# Patient Record
Sex: FEMALE | Race: WHITE
[De-identification: names, ages, dates, MRNs, and addresses within clinical notes are randomized per-mention and may not be internally consistent; named-entity substitution may affect disease eponyms.]

---

## 2017-04-05 NOTE — NUR
PT BIB [facility] C/O AMS SINCE YESTERDAY. A/OX1 ONLY, NORMALLY X4. RESP EVEN 
UNLABORED. DENIES PAIN. SKIN WARM NONDIAPHORETIC. PT'S DAUGHTER STATES "HER 
SPEECH WAS SLURRED YESTERDAY"; NO SLURRED SPEECH OR OTHER FOCAL DEFICITS NOTED. 
IN ER BED 09 ON MONITOR.

## 2017-04-05 NOTE — NUR
TELE/RN

PATIENT IS SLEEPING AT THIS TIME, AROUSABLE, APPEAR COMFORTABLE, NO SIGNS OF DISTRESS NOTED, 
CALL LIGHT IN REACH. WILL CONTINUE TO MONITOR.

## 2017-04-05 NOTE — NUR
TELE/RN

RECEIVE PATIENT AWAKE, ALERT, BUT NOT SO VERBALLY RESPONSIVE, COMFORTABLE, NO SIGNS OF 
DISTRESS NOTED. WILL DO ADMISSION LATER.

## 2017-04-05 NOTE — NUR
TELE/RN

ADMISSION DONE PER PROTOCOL, PATIENT IS NOT A GOOD HISTORIAN. DAUGHTER, ARNOL, AT BEDSIDE 
PROVIDED THE INFORMATION FOR THE PATIENT. FALL PRECAUTION PER PROTOCOL INSTITUTE. WILL 
CONTINUE TO MONITOR.

## 2017-04-06 NOTE — NUR
TELE RN OPENING NOTES 



RECEIVED PATIENT AWAKE AND IN STABLE CONDITION FROM NIGHT SHIFT NURSE. TELE MONITOR ON 
READING SINUS RHYTHM . ALERT X1.  NO SIGNS OF DISTRESS NOTED. WILL CONTINUE TO MONITOR.

## 2017-04-06 NOTE — NUR
TELE/RN

PATIENT IS SLEEPING, AROUSABLE, NO CHANGE IN CONDITION. ALL NEEDS ATTENDED AT THIS TIME. 
WILL CONTINUE TO MONITOR.

## 2017-04-07 NOTE — NUR
PT IMPROVED MENTALLY, A/O X3 FOR NOW, NO SOB OR DISTRESS NOTED, NO BEHAVIOR PROBLEMS NOTED, 
COMPLIANT WITH HER IV MEDICATIONS, WILL INDORSE TO NEXT SHIFT FOR ANUJA.

## 2017-04-07 NOTE — NUR
pt refused Kayexalate, pt educated but she stated: " I do not have any problems, my 
potassium level is perfect, I will not drink that".

## 2017-04-07 NOTE — NUR
TELE RN NOTES



AWAKE & RESPONSIVE. NOT IN ANY DISTRESS. NO SOB NOTED. DENIES ANY PAIN OR DISCOMFORT AT THIS 
TIME. ON TELE SR @ 60 WITH IV-HL PATENT & INTACT. MONITORED ACCORDINGLY. CALL LIGHT WITHIN 
REACH. BED IN LOWEST POSITION. SR UP X 3 WITH BED ALARM ON FOR SAFETY. WILL ENDORSE TO NEXT 
SHIFT.

## 2017-04-07 NOTE — NUR
MS RN OPENING NOTES



RECEIVED PATIENT IN BED IN STABLE CONDITION, IV SITE INTACT WITH S/S OF INFILTRATION. NO S/S 
OF DISTRESS NO SOB, NO CHEST PAIN. NO S/S PAIN, SAFE FREE ENVIRONMENT PROVIDED FREE OF 
CLUTTERS, WILL CONTINUE TO MONITOR, ON LOW BED TO ENSURE SAFETY, CALL LIGHT WITHIN REACH.

## 2017-04-08 NOTE — NUR
RN DISCHARGED NOTES



PATIENT DISCHARGE HOME AND LEFT UNIT AT 1630 IN STABLE CONDITION VIA WHEELCHAIR ACCOMPANIED 
BY  OF MEDICAL TRANSPORTATION SERVICE AND CAREGIVER. ALERT AND ORIENTED X 3, NO 
COMPLAINTS OF PAIN OR DISCOMFORTS DURING DISCHARGE. V/S CHECKED AND RECORDED. SKIN IS 
INTACT. BELONGINGS CHECKED, COUNTED SIGNED AND FORM. PNEUMO VACCINE REFUSED BY PATIENT. 
HEALTH TEACHINGS GIVEN TO PATIENT AND CAREGIVER AND BOTH VERBALIZED UNDERSTANDING. MD AND 
CHARGE NURSE AWARE OF DISCHARGE.

## 2017-04-08 NOTE — NUR
MS RN CLOSING NOTES



PATIENT COMFORTABLY ASLEEP AND EASILY AWAKEN,  IV SITE NO S/S OF INFILTRATED, IV ONGOING 
75CC/HR TOLERATING WELL. ALL DUE MEDS WAS GIVEN.  PATIENT DENIES PAIN AT THIS TIME. 0/10 
RESPIRATIONS EVEN AND UNLABORED. NO S/S OF ACUTE DISTRESS, NO SOB, NO COUGH, NO CONGESTION, 
SKIN WARM AND DRY TO TOUCH, AFEBRILE, ALL NURSING CARE NEEDS PROVIDED AND RENDERED, NEEDS 
ATTENDED AND ANTICIPATED,  KEPT CLEAN AND DRY AND COMFORTABLE, BLADDER NOT DISTENDED, GOOD 
SKIN CARE PROVIDED. ABDOMEN SOFT AND NON TENDER. NO C/O OF CONSTIPATION. ALL DUE MEDS WAS 
GIVEN AS TOLERATED. FREQUENT VISUAL CHECK DONE FOR SAFETY EVERY 2 HOURS. SAFE HAZARD FREE 
ENVIRONMENT PROVIDED. CALL LIGHT WITHIN EASY TO REACH, ON LOW BED AT ALL TIMES TO ENSURE 
SAFETY, WILL ENDORSE TO THE NEXT SHIFT CONTINUE PLAN OF CARE.

## 2017-04-08 NOTE — NUR
MS RN OPENING NOTES



PATIENT RECEIVED IN BED IN AWAKE IN NO ACUTE SIGNS OF DISTRESS. ALERT AND ORIENTED X1. NO 
S/S OF PAIN OR DISCOMFORTS NOTED. IV ACCESS SITE INTACT AND PATENT WITH ONGOING IVF OF NS @ 
75CC/HR, NO SIGNS OF INFILTRATION NOTED. ON ROOM AIR, NO SOB OBSERVED. BED ON LOW POSITION 
AND LOCKED. CALL LIGHT WITHIN EASY REACH. SAFE FREE ENVIRONMENT MAINTAINED. .  WILL CONTINUE 
TO MONITOR ACCORDINGLY.

## 2019-01-21 ENCOUNTER — HOSPITAL ENCOUNTER (EMERGENCY)
Dept: HOSPITAL 54 - ER | Age: 84
Discharge: HOME | End: 2019-01-21
Payer: MEDICARE

## 2019-01-21 VITALS — SYSTOLIC BLOOD PRESSURE: 169 MMHG | DIASTOLIC BLOOD PRESSURE: 75 MMHG

## 2019-01-21 VITALS — BODY MASS INDEX: 28.35 KG/M2 | WEIGHT: 160 LBS | HEIGHT: 63 IN

## 2019-01-21 DIAGNOSIS — E78.00: ICD-10-CM

## 2019-01-21 DIAGNOSIS — Z85.3: ICD-10-CM

## 2019-01-21 DIAGNOSIS — Z91.048: ICD-10-CM

## 2019-01-21 DIAGNOSIS — Z96.651: ICD-10-CM

## 2019-01-21 DIAGNOSIS — E03.9: ICD-10-CM

## 2019-01-21 DIAGNOSIS — E11.22: ICD-10-CM

## 2019-01-21 DIAGNOSIS — I25.2: ICD-10-CM

## 2019-01-21 DIAGNOSIS — Z79.82: ICD-10-CM

## 2019-01-21 DIAGNOSIS — Z90.10: ICD-10-CM

## 2019-01-21 DIAGNOSIS — N18.9: ICD-10-CM

## 2019-01-21 DIAGNOSIS — I12.9: ICD-10-CM

## 2019-01-21 DIAGNOSIS — G89.29: ICD-10-CM

## 2019-01-21 DIAGNOSIS — R04.0: Primary | ICD-10-CM

## 2019-01-21 LAB
ALBUMIN SERPL BCP-MCNC: 3.7 G/DL (ref 3.4–5)
ALP SERPL-CCNC: 69 U/L (ref 46–116)
ALT SERPL W P-5'-P-CCNC: 41 U/L (ref 12–78)
AST SERPL W P-5'-P-CCNC: 24 U/L (ref 15–37)
BASOPHILS # BLD AUTO: 0 /CMM (ref 0–0.2)
BASOPHILS NFR BLD AUTO: 0.4 % (ref 0–2)
BILIRUB DIRECT SERPL-MCNC: 0.1 MG/DL (ref 0–0.2)
BILIRUB SERPL-MCNC: 0.4 MG/DL (ref 0.2–1)
BUN SERPL-MCNC: 48 MG/DL (ref 7–18)
CALCIUM SERPL-MCNC: 9.3 MG/DL (ref 8.5–10.1)
CHLORIDE SERPL-SCNC: 110 MMOL/L (ref 98–107)
CO2 SERPL-SCNC: 26 MMOL/L (ref 21–32)
CREAT SERPL-MCNC: 3.5 MG/DL (ref 0.6–1.3)
EOSINOPHIL NFR BLD AUTO: 3.6 % (ref 0–6)
GLUCOSE SERPL-MCNC: 170 MG/DL (ref 74–106)
HCT VFR BLD AUTO: 29 % (ref 33–45)
HGB BLD-MCNC: 9.2 G/DL (ref 11.5–14.8)
LIPASE SERPL-CCNC: 305 U/L (ref 73–393)
LYMPHOCYTES NFR BLD AUTO: 1.4 /CMM (ref 0.8–4.8)
LYMPHOCYTES NFR BLD AUTO: 20.6 % (ref 20–44)
MCHC RBC AUTO-ENTMCNC: 32 G/DL (ref 31–36)
MCV RBC AUTO: 94 FL (ref 82–100)
MONOCYTES NFR BLD AUTO: 0.7 /CMM (ref 0.1–1.3)
MONOCYTES NFR BLD AUTO: 10.5 % (ref 2–12)
NEUTROPHILS # BLD AUTO: 4.3 /CMM (ref 1.8–8.9)
NEUTROPHILS NFR BLD AUTO: 64.9 % (ref 43–81)
PLATELET # BLD AUTO: 187 /CMM (ref 150–450)
POTASSIUM SERPL-SCNC: 4.5 MMOL/L (ref 3.5–5.1)
PROT SERPL-MCNC: 7.2 G/DL (ref 6.4–8.2)
RBC # BLD AUTO: 3.12 MIL/UL (ref 4–5.2)
SODIUM SERPL-SCNC: 148 MMOL/L (ref 136–145)
WBC NRBC COR # BLD AUTO: 6.6 K/UL (ref 4.3–11)

## 2019-01-21 NOTE — NUR
BIB  FROM [facility]  C/O NOSE BLEED X 2 HRS - NO INJURY, H/O HIGH 
BP. ALERT AND ORIENTED X 4, VERBALLY RESPONSIVE AND ABLE TO MAKE NEEDS KNOWN. 
DENIES ANY PAIN AT THIS TIME. ON ROOM AIR, BREATHING EVENLY AND UNLABORED. 
CONNECTED TO THE MONITOR. DR. FLANNERY AT BEDSIDE FOR EVAL. KEPT COMFORTABLE. 
WILL CONTINUE TO MONITOR ACCORDINGLY.

## 2019-01-24 ENCOUNTER — HOSPITAL ENCOUNTER (EMERGENCY)
Dept: HOSPITAL 54 - ER | Age: 84
Discharge: HOME | End: 2019-01-24
Payer: MEDICARE

## 2019-01-24 VITALS
SYSTOLIC BLOOD PRESSURE: 158 MMHG | BODY MASS INDEX: 26.68 KG/M2 | DIASTOLIC BLOOD PRESSURE: 80 MMHG | HEIGHT: 62 IN | WEIGHT: 145 LBS

## 2019-01-24 DIAGNOSIS — Z85.3: ICD-10-CM

## 2019-01-24 DIAGNOSIS — M25.561: ICD-10-CM

## 2019-01-24 DIAGNOSIS — I12.9: ICD-10-CM

## 2019-01-24 DIAGNOSIS — Z48.01: Primary | ICD-10-CM

## 2019-01-24 DIAGNOSIS — E78.00: ICD-10-CM

## 2019-01-24 DIAGNOSIS — G89.29: ICD-10-CM

## 2019-01-24 DIAGNOSIS — E03.9: ICD-10-CM

## 2019-01-24 DIAGNOSIS — Z90.10: ICD-10-CM

## 2019-01-24 DIAGNOSIS — Z91.048: ICD-10-CM

## 2019-01-24 DIAGNOSIS — E11.22: ICD-10-CM

## 2019-01-24 DIAGNOSIS — N18.9: ICD-10-CM

## 2019-01-24 DIAGNOSIS — I25.2: ICD-10-CM

## 2019-01-24 DIAGNOSIS — Z79.82: ICD-10-CM

## 2019-01-24 PROCEDURE — Z7502: HCPCS
